# Patient Record
Sex: MALE | URBAN - METROPOLITAN AREA
[De-identification: names, ages, dates, MRNs, and addresses within clinical notes are randomized per-mention and may not be internally consistent; named-entity substitution may affect disease eponyms.]

---

## 2023-10-20 PROBLEM — E66.9 OBESITY: Status: ACTIVE | Noted: 2023-10-20

## 2023-10-20 PROBLEM — H66.90 OTITIS MEDIA: Status: ACTIVE | Noted: 2022-03-08

## 2023-10-20 PROBLEM — H60.92 OTITIS EXTERNA, LEFT: Status: ACTIVE | Noted: 2022-12-23

## 2023-10-20 PROBLEM — H60.391 OTHER INFECTIVE OTITIS EXTERNA, RIGHT EAR: Status: ACTIVE | Noted: 2022-02-16

## 2023-10-23 ENCOUNTER — OFFICE VISIT (OUTPATIENT)
Dept: OPHTHALMOLOGY | Facility: CLINIC | Age: 33
End: 2023-10-23
Payer: COMMERCIAL

## 2023-10-23 DIAGNOSIS — Z01.00 NORMAL EYE AND VISION EXAM: Primary | ICD-10-CM

## 2023-10-23 PROCEDURE — LSREX EMPLOYEE LASER EYE EXAM: Performed by: STUDENT IN AN ORGANIZED HEALTH CARE EDUCATION/TRAINING PROGRAM

## 2023-10-23 ASSESSMENT — CONF VISUAL FIELD
OS_SUPERIOR_TEMPORAL_RESTRICTION: 0
METHOD: COUNTING FINGERS
OD_SUPERIOR_NASAL_RESTRICTION: 0
OD_NORMAL: 1
OS_INFERIOR_NASAL_RESTRICTION: 0
OS_SUPERIOR_NASAL_RESTRICTION: 0
OD_SUPERIOR_TEMPORAL_RESTRICTION: 0
OD_INFERIOR_NASAL_RESTRICTION: 0
OD_INFERIOR_TEMPORAL_RESTRICTION: 0
OS_INFERIOR_TEMPORAL_RESTRICTION: 0
OS_NORMAL: 1

## 2023-10-23 ASSESSMENT — VISUAL ACUITY
OD_SC: 20/40
OS_PH_SC: 20/25
METHOD: SNELLEN - LINEAR
OD_PH_SC: 20/25
OS_SC: 20/40
OD_PH_SC+: -

## 2023-10-23 ASSESSMENT — CUP TO DISC RATIO
OD_RATIO: .30
OS_RATIO: .30

## 2023-10-23 ASSESSMENT — ENCOUNTER SYMPTOMS
GASTROINTESTINAL NEGATIVE: 0
CARDIOVASCULAR NEGATIVE: 0
ENDOCRINE NEGATIVE: 0
RESPIRATORY NEGATIVE: 0
CONSTITUTIONAL NEGATIVE: 0
NEUROLOGICAL NEGATIVE: 0
EYES NEGATIVE: 0
ALLERGIC/IMMUNOLOGIC NEGATIVE: 0
MUSCULOSKELETAL NEGATIVE: 0
HEMATOLOGIC/LYMPHATIC NEGATIVE: 0
PSYCHIATRIC NEGATIVE: 0

## 2023-10-23 ASSESSMENT — SLIT LAMP EXAM - LIDS
COMMENTS: NORMAL
COMMENTS: NORMAL

## 2023-10-23 ASSESSMENT — TONOMETRY
OD_IOP_MMHG: 17
OS_IOP_MMHG: 17
IOP_METHOD: GOLDMANN APPLANATION

## 2023-10-23 ASSESSMENT — EXTERNAL EXAM - RIGHT EYE: OD_EXAM: NORMAL

## 2023-10-23 ASSESSMENT — EXTERNAL EXAM - LEFT EYE: OS_EXAM: NORMAL

## 2023-10-23 NOTE — PROGRESS NOTES
Assessment/Plan   Problem List Items Addressed This Visit          Eye/Vision problems    Normal eye and vision exam - Primary     Patient here for employee laser exam doing well with PH 20/25 right eye (OD)+left eye (OS) without correction (SC). Ocular health wnl on DFE.     RTC for annual prn

## 2023-10-23 NOTE — ASSESSMENT & PLAN NOTE
Patient here for employee laser exam doing well with PH 20/25 right eye (OD)+left eye (OS) without correction (SC). Ocular health wnl on DFE.     RTC for annual prn

## 2024-04-11 ENCOUNTER — LAB (OUTPATIENT)
Dept: LAB | Facility: LAB | Age: 34
End: 2024-04-11
Payer: COMMERCIAL

## 2024-04-11 LAB — COTININE UR QL SCN: NEGATIVE
